# Patient Record
Sex: FEMALE | Race: WHITE | NOT HISPANIC OR LATINO
[De-identification: names, ages, dates, MRNs, and addresses within clinical notes are randomized per-mention and may not be internally consistent; named-entity substitution may affect disease eponyms.]

---

## 2017-01-29 ENCOUNTER — RESULT REVIEW (OUTPATIENT)
Age: 67
End: 2017-01-29

## 2020-04-25 ENCOUNTER — MESSAGE (OUTPATIENT)
Age: 70
End: 2020-04-25

## 2020-09-25 ENCOUNTER — LABORATORY RESULT (OUTPATIENT)
Age: 70
End: 2020-09-25

## 2020-09-25 ENCOUNTER — APPOINTMENT (OUTPATIENT)
Dept: DISASTER EMERGENCY | Facility: CLINIC | Age: 70
End: 2020-09-25

## 2020-09-27 ENCOUNTER — TRANSCRIPTION ENCOUNTER (OUTPATIENT)
Age: 70
End: 2020-09-27

## 2020-09-28 ENCOUNTER — RESULT REVIEW (OUTPATIENT)
Age: 70
End: 2020-09-28

## 2020-09-28 ENCOUNTER — OUTPATIENT (OUTPATIENT)
Dept: OUTPATIENT SERVICES | Facility: HOSPITAL | Age: 70
LOS: 1 days | End: 2020-09-28
Payer: MEDICARE

## 2020-09-28 DIAGNOSIS — Z98.89 OTHER SPECIFIED POSTPROCEDURAL STATES: Chronic | ICD-10-CM

## 2020-09-28 DIAGNOSIS — Z12.11 ENCOUNTER FOR SCREENING FOR MALIGNANT NEOPLASM OF COLON: ICD-10-CM

## 2020-09-28 DIAGNOSIS — Z92.21 PERSONAL HISTORY OF ANTINEOPLASTIC CHEMOTHERAPY: Chronic | ICD-10-CM

## 2020-09-28 DIAGNOSIS — Z90.711 ACQUIRED ABSENCE OF UTERUS WITH REMAINING CERVICAL STUMP: Chronic | ICD-10-CM

## 2020-09-28 DIAGNOSIS — R10.13 EPIGASTRIC PAIN: ICD-10-CM

## 2020-09-28 PROCEDURE — 88305 TISSUE EXAM BY PATHOLOGIST: CPT | Mod: 26

## 2020-09-28 PROCEDURE — 88313 SPECIAL STAINS GROUP 2: CPT | Mod: 26

## 2020-09-28 PROCEDURE — 88312 SPECIAL STAINS GROUP 1: CPT | Mod: 26

## 2020-09-28 PROCEDURE — 88313 SPECIAL STAINS GROUP 2: CPT

## 2020-09-28 PROCEDURE — G0121: CPT

## 2020-09-28 PROCEDURE — 88312 SPECIAL STAINS GROUP 1: CPT

## 2020-09-28 PROCEDURE — 43239 EGD BIOPSY SINGLE/MULTIPLE: CPT

## 2020-09-28 PROCEDURE — 88305 TISSUE EXAM BY PATHOLOGIST: CPT

## 2022-11-09 ENCOUNTER — OFFICE (OUTPATIENT)
Dept: URBAN - METROPOLITAN AREA CLINIC 93 | Facility: CLINIC | Age: 72
Setting detail: OPHTHALMOLOGY
End: 2022-11-09
Payer: MEDICARE

## 2022-11-09 DIAGNOSIS — H33.321: ICD-10-CM

## 2022-11-09 PROCEDURE — 92134 CPTRZ OPH DX IMG PST SGM RTA: CPT | Performed by: OPHTHALMOLOGY

## 2022-11-09 PROCEDURE — 92012 INTRM OPH EXAM EST PATIENT: CPT | Performed by: OPHTHALMOLOGY

## 2022-11-09 PROCEDURE — 92201 OPSCPY EXTND RTA DRAW UNI/BI: CPT | Performed by: OPHTHALMOLOGY

## 2022-11-09 ASSESSMENT — CONFRONTATIONAL VISUAL FIELD TEST (CVF)
OS_FINDINGS: FULL
OD_FINDINGS: FULL

## 2022-11-09 ASSESSMENT — KERATOMETRY
OS_K2POWER_DIOPTERS: 45.00
OS_K1POWER_DIOPTERS: 44.75
OD_AXISANGLE_DEGREES: 100
OD_K1POWER_DIOPTERS: 45.00
OD_K2POWER_DIOPTERS: 45.50
OS_AXISANGLE_DEGREES: 168

## 2022-11-09 ASSESSMENT — REFRACTION_AUTOREFRACTION
OS_CYLINDER: -0.75
OD_SPHERE: UNABLE
OS_AXIS: 096
OS_SPHERE: +3.00

## 2022-11-09 ASSESSMENT — VISUAL ACUITY
OS_BCVA: 20/40+2
OD_BCVA: 20/30-2

## 2022-11-09 ASSESSMENT — TONOMETRY
OS_IOP_MMHG: 12
OD_IOP_MMHG: 12

## 2022-11-09 ASSESSMENT — AXIALLENGTH_DERIVED: OS_AL: 22.1571

## 2022-11-09 ASSESSMENT — SPHEQUIV_DERIVED: OS_SPHEQUIV: 2.625

## 2022-11-09 ASSESSMENT — SUPERFICIAL PUNCTATE KERATITIS (SPK)
OS_SPK: 2+
OD_SPK: 2+

## 2022-11-09 ASSESSMENT — LID EXAM ASSESSMENTS
OD_COMMENTS: GOOD PARALYSIS AT AREA INJECTED UL COMMENTS
OD_BLEPHARITIS: 2+
OS_BLEPHARITIS: 2+
OS_COMMENTS: GOOD PARALYSIS AT AREA INJECTED UL COMMENTS

## 2023-04-04 ENCOUNTER — OFFICE (OUTPATIENT)
Facility: LOCATION | Age: 73
Setting detail: OPHTHALMOLOGY
End: 2023-04-04
Payer: MEDICARE

## 2023-04-04 DIAGNOSIS — H33.321: ICD-10-CM

## 2023-04-04 PROCEDURE — 92012 INTRM OPH EXAM EST PATIENT: CPT | Performed by: OPHTHALMOLOGY

## 2023-04-04 PROCEDURE — 92134 CPTRZ OPH DX IMG PST SGM RTA: CPT | Performed by: OPHTHALMOLOGY

## 2023-04-04 ASSESSMENT — KERATOMETRY
OD_AXISANGLE_DEGREES: 100
OD_K2POWER_DIOPTERS: 45.50
OS_AXISANGLE_DEGREES: 168
OD_K1POWER_DIOPTERS: 45.00
OS_K2POWER_DIOPTERS: 45.00
OS_K1POWER_DIOPTERS: 44.75

## 2023-04-04 ASSESSMENT — LID EXAM ASSESSMENTS
OS_BLEPHARITIS: 2+
OD_COMMENTS: GOOD PARALYSIS AT AREA INJECTED UL COMMENTS
OD_BLEPHARITIS: 2+
OS_COMMENTS: GOOD PARALYSIS AT AREA INJECTED UL COMMENTS

## 2023-04-04 ASSESSMENT — TONOMETRY
OS_IOP_MMHG: 15
OD_IOP_MMHG: 14

## 2023-04-04 ASSESSMENT — REFRACTION_AUTOREFRACTION
OS_AXIS: 096
OS_SPHERE: +3.00
OS_CYLINDER: -0.75
OD_SPHERE: UNABLE

## 2023-04-04 ASSESSMENT — SPHEQUIV_DERIVED: OS_SPHEQUIV: 2.625

## 2023-04-04 ASSESSMENT — CONFRONTATIONAL VISUAL FIELD TEST (CVF)
OD_FINDINGS: FULL
OS_FINDINGS: FULL

## 2023-04-04 ASSESSMENT — SUPERFICIAL PUNCTATE KERATITIS (SPK)
OS_SPK: 2+
OD_SPK: 2+

## 2023-04-04 ASSESSMENT — VISUAL ACUITY
OD_BCVA: 20/40-2
OS_BCVA: 20/50+2

## 2023-04-04 ASSESSMENT — AXIALLENGTH_DERIVED: OS_AL: 22.1571

## 2023-10-03 ENCOUNTER — OFFICE (OUTPATIENT)
Facility: LOCATION | Age: 73
Setting detail: OPHTHALMOLOGY
End: 2023-10-03
Payer: MEDICARE

## 2023-10-03 DIAGNOSIS — H33.321: ICD-10-CM

## 2023-10-03 PROCEDURE — 92134 CPTRZ OPH DX IMG PST SGM RTA: CPT | Performed by: OPHTHALMOLOGY

## 2023-10-03 PROCEDURE — 99213 OFFICE O/P EST LOW 20 MIN: CPT | Performed by: OPHTHALMOLOGY

## 2023-10-03 ASSESSMENT — LID EXAM ASSESSMENTS
OD_BLEPHARITIS: 2+
OS_BLEPHARITIS: 2+
OD_COMMENTS: GOOD PARALYSIS AT AREA INJECTED UL COMMENTS
OS_COMMENTS: GOOD PARALYSIS AT AREA INJECTED UL COMMENTS

## 2023-10-03 ASSESSMENT — SUPERFICIAL PUNCTATE KERATITIS (SPK)
OD_SPK: 2+
OS_SPK: 2+

## 2023-10-03 ASSESSMENT — VISUAL ACUITY
OD_BCVA: 20/60
OS_BCVA: 20/50+2

## 2023-10-03 ASSESSMENT — REFRACTION_AUTOREFRACTION
OS_SPHERE: +3.00
OS_AXIS: 096
OS_CYLINDER: -0.75
OD_SPHERE: UNABLE

## 2023-10-03 ASSESSMENT — KERATOMETRY
OD_K1POWER_DIOPTERS: 45.00
OD_AXISANGLE_DEGREES: 100
OS_K1POWER_DIOPTERS: 44.75
OD_K2POWER_DIOPTERS: 45.50
OS_AXISANGLE_DEGREES: 168
OS_K2POWER_DIOPTERS: 45.00

## 2023-10-03 ASSESSMENT — AXIALLENGTH_DERIVED: OS_AL: 22.1571

## 2023-10-03 ASSESSMENT — SPHEQUIV_DERIVED: OS_SPHEQUIV: 2.625

## 2023-10-25 ENCOUNTER — OFFICE (OUTPATIENT)
Facility: LOCATION | Age: 73
Setting detail: OPHTHALMOLOGY
End: 2023-10-25
Payer: MEDICARE

## 2023-10-25 DIAGNOSIS — H16.223: ICD-10-CM

## 2023-10-25 DIAGNOSIS — H01.004: ICD-10-CM

## 2023-10-25 DIAGNOSIS — H01.001: ICD-10-CM

## 2023-10-25 DIAGNOSIS — H01.005: ICD-10-CM

## 2023-10-25 DIAGNOSIS — H01.002: ICD-10-CM

## 2023-10-25 DIAGNOSIS — H33.321: ICD-10-CM

## 2023-10-25 DIAGNOSIS — H25.13: ICD-10-CM

## 2023-10-25 PROCEDURE — 99214 OFFICE O/P EST MOD 30 MIN: CPT | Performed by: OPHTHALMOLOGY

## 2023-10-25 ASSESSMENT — SUPERFICIAL PUNCTATE KERATITIS (SPK)
OS_SPK: 2+
OD_SPK: 2+

## 2023-10-25 ASSESSMENT — REFRACTION_CURRENTRX
OS_SPHERE: +4.50
OS_ADD: +2.25
OD_ADD: +2.25
OD_SPHERE: +5.25
OD_AXIS: 090
OD_CYLINDER: -0.75
OS_CYLINDER: -1.00
OD_OVR_VA: 20/
OS_AXIS: 090
OS_OVR_VA: 20/

## 2023-10-25 ASSESSMENT — KERATOMETRY
OD_CYLPOWER_DEGREES: 0.5
OD_K1K2_AVERAGE: 44.75
OS_K2POWER_DIOPTERS: 45.25
OD_K1POWER_DIOPTERS: 45.00
OD_K2POWER_DIOPTERS: 44.50
OD_CYLAXISANGLE_DEGREES: 084
OD_AXISANGLE2_DEGREES: 174
OS_K2POWER_DIOPTERS: 45.25
OD_K1POWER_DIOPTERS: 45.00
OD_K2POWER_DIOPTERS: 44.50
OS_K1POWER_DIOPTERS: 44.75
OS_CYLPOWER_DEGREES: 0.5
OS_AXISANGLE_DEGREES: 136
OS_K1POWER_DIOPTERS: 44.75
OS_CYLAXISANGLE_DEGREES: 046
OD_AXISANGLE_DEGREES: 084
OS_K1K2_AVERAGE: 45
OS_AXISANGLE_DEGREES: 046
OS_AXISANGLE2_DEGREES: 046
OD_AXISANGLE_DEGREES: 084

## 2023-10-25 ASSESSMENT — TONOMETRY
OD_IOP_MMHG: 14
OS_IOP_MMHG: 14

## 2023-10-25 ASSESSMENT — AXIALLENGTH_DERIVED
OD_AL: 21.4076
OS_AL: 22.2892

## 2023-10-25 ASSESSMENT — LID EXAM ASSESSMENTS
OS_COMMENTS: GOOD PARALYSIS AT AREA INJECTED UL COMMENTS
OD_COMMENTS: GOOD PARALYSIS AT AREA INJECTED UL COMMENTS
OD_BLEPHARITIS: 2+
OS_BLEPHARITIS: 2+

## 2023-10-25 ASSESSMENT — VISUAL ACUITY
OD_BCVA: 20/50
OS_BCVA: 20/40-

## 2023-10-25 ASSESSMENT — REFRACTION_AUTOREFRACTION
OS_CYLINDER: -1.75
OS_AXIS: 091
OD_CYLINDER: -1.50
OD_SPHERE: +5.75
OS_SPHERE: +3.00
OD_AXIS: 064

## 2023-10-25 ASSESSMENT — CONFRONTATIONAL VISUAL FIELD TEST (CVF)
OD_FINDINGS: FULL
OS_FINDINGS: FULL

## 2023-10-25 ASSESSMENT — SPHEQUIV_DERIVED
OS_SPHEQUIV: 2.125
OD_SPHEQUIV: 5

## 2024-03-26 ENCOUNTER — APPOINTMENT (OUTPATIENT)
Dept: CARDIOLOGY | Facility: CLINIC | Age: 74
End: 2024-03-26
Payer: MEDICARE

## 2024-03-26 ENCOUNTER — APPOINTMENT (OUTPATIENT)
Dept: INTERNAL MEDICINE | Facility: CLINIC | Age: 74
End: 2024-03-26
Payer: MEDICARE

## 2024-03-26 ENCOUNTER — NON-APPOINTMENT (OUTPATIENT)
Age: 74
End: 2024-03-26

## 2024-03-26 VITALS
BODY MASS INDEX: 24.8 KG/M2 | SYSTOLIC BLOOD PRESSURE: 120 MMHG | OXYGEN SATURATION: 96 % | HEIGHT: 63 IN | TEMPERATURE: 98 F | DIASTOLIC BLOOD PRESSURE: 78 MMHG | WEIGHT: 140 LBS | HEART RATE: 68 BPM

## 2024-03-26 DIAGNOSIS — Z00.00 ENCOUNTER FOR GENERAL ADULT MEDICAL EXAMINATION W/OUT ABNORMAL FINDINGS: ICD-10-CM

## 2024-03-26 DIAGNOSIS — K56.609 UNSPECIFIED INTESTINAL OBSTRUCTION, UNSPECIFIED AS TO PARTIAL VERSUS COMPLETE OBSTRUCTION: ICD-10-CM

## 2024-03-26 DIAGNOSIS — Z12.9 ENCOUNTER FOR SCREENING FOR MALIGNANT NEOPLASM, SITE UNSPECIFIED: ICD-10-CM

## 2024-03-26 DIAGNOSIS — R22.1 LOCALIZED SWELLING, MASS AND LUMP, NECK: ICD-10-CM

## 2024-03-26 DIAGNOSIS — Z13.228 ENCOUNTER FOR SCREENING FOR OTHER METABOLIC DISORDERS: ICD-10-CM

## 2024-03-26 DIAGNOSIS — C50.919 MALIGNANT NEOPLASM OF UNSPECIFIED SITE OF UNSPECIFIED FEMALE BREAST: ICD-10-CM

## 2024-03-26 DIAGNOSIS — M51.9 UNSPECIFIED THORACIC, THORACOLUMBAR AND LUMBOSACRAL INTERVERTEBRAL DISC DISORDER: ICD-10-CM

## 2024-03-26 DIAGNOSIS — E66.9 OBESITY, UNSPECIFIED: ICD-10-CM

## 2024-03-26 DIAGNOSIS — Z13.6 ENCOUNTER FOR SCREENING FOR CARDIOVASCULAR DISORDERS: ICD-10-CM

## 2024-03-26 PROCEDURE — 93306 TTE W/DOPPLER COMPLETE: CPT

## 2024-03-26 PROCEDURE — 99204 OFFICE O/P NEW MOD 45 MIN: CPT

## 2024-03-26 PROCEDURE — 93000 ELECTROCARDIOGRAM COMPLETE: CPT

## 2024-03-27 PROBLEM — Z13.228 SCREENING FOR METABOLIC DISORDER: Status: ACTIVE | Noted: 2024-03-26

## 2024-03-27 PROBLEM — R22.1 NECK FULLNESS: Status: ACTIVE | Noted: 2024-03-27

## 2024-03-27 PROBLEM — M51.9 LUMBAR DISC DISEASE: Status: ACTIVE | Noted: 2024-03-26

## 2024-03-27 PROBLEM — K56.609 BOWEL OBSTRUCTION: Status: ACTIVE | Noted: 2024-03-26

## 2024-03-27 PROBLEM — E66.9 OBESITY (BMI 30.0-34.9): Status: ACTIVE | Noted: 2024-03-26

## 2024-03-27 PROBLEM — C50.919 BREAST CANCER: Status: ACTIVE | Noted: 2024-03-26

## 2024-03-27 PROBLEM — Z12.9 SCREENING FOR CANCER: Status: ACTIVE | Noted: 2024-03-26

## 2024-03-27 PROBLEM — Z13.6 SCREENING FOR HEART DISEASE: Status: ACTIVE | Noted: 2024-03-26

## 2024-03-27 LAB
25(OH)D3 SERPL-MCNC: 74.6 NG/ML
ALBUMIN SERPL ELPH-MCNC: 4.7 G/DL
ALP BLD-CCNC: 84 U/L
ALT SERPL-CCNC: 21 U/L
ANION GAP SERPL CALC-SCNC: 16 MMOL/L
APPEARANCE: CLEAR
AST SERPL-CCNC: 22 U/L
BACTERIA: NEGATIVE /HPF
BASOPHILS # BLD AUTO: 0.01 K/UL
BASOPHILS NFR BLD AUTO: 0.2 %
BILIRUB SERPL-MCNC: 0.3 MG/DL
BILIRUBIN URINE: NEGATIVE
BLOOD URINE: NEGATIVE
BUN SERPL-MCNC: 13 MG/DL
CALCIUM OXALATE CRYSTALS: PRESENT
CALCIUM SERPL-MCNC: 10.1 MG/DL
CAST: 0 /LPF
CHLORIDE SERPL-SCNC: 103 MMOL/L
CHOLEST SERPL-MCNC: 198 MG/DL
CK SERPL-CCNC: 84 U/L
CO2 SERPL-SCNC: 23 MMOL/L
COLOR: YELLOW
CREAT SERPL-MCNC: 0.73 MG/DL
CREAT SPEC-SCNC: 143 MG/DL
EGFR: 86 ML/MIN/1.73M2
EOSINOPHIL # BLD AUTO: 0.02 K/UL
EOSINOPHIL NFR BLD AUTO: 0.3 %
EPITHELIAL CELLS: 1 /HPF
ESTIMATED AVERAGE GLUCOSE: 105 MG/DL
FERRITIN SERPL-MCNC: 117 NG/ML
FOLATE SERPL-MCNC: >20 NG/ML
GLUCOSE QUALITATIVE U: NEGATIVE MG/DL
GLUCOSE SERPL-MCNC: 69 MG/DL
HBA1C MFR BLD HPLC: 5.3 %
HCT VFR BLD CALC: 42.2 %
HDLC SERPL-MCNC: 45 MG/DL
HGB BLD-MCNC: 14.3 G/DL
IMM GRANULOCYTES NFR BLD AUTO: 0 %
IRON SATN MFR SERPL: 25 %
IRON SERPL-MCNC: 86 UG/DL
KETONES URINE: NEGATIVE MG/DL
LDLC SERPL CALC-MCNC: 111 MG/DL
LEUKOCYTE ESTERASE URINE: ABNORMAL
LYMPHOCYTES # BLD AUTO: 2.16 K/UL
LYMPHOCYTES NFR BLD AUTO: 35.9 %
MAGNESIUM SERPL-MCNC: 2.3 MG/DL
MAN DIFF?: NORMAL
MCHC RBC-ENTMCNC: 31.5 PG
MCHC RBC-ENTMCNC: 33.9 GM/DL
MCV RBC AUTO: 93 FL
MICROALBUMIN 24H UR DL<=1MG/L-MCNC: 1.6 MG/DL
MICROALBUMIN/CREAT 24H UR-RTO: 11 MG/G
MICROSCOPIC-UA: NORMAL
MONOCYTES # BLD AUTO: 0.52 K/UL
MONOCYTES NFR BLD AUTO: 8.7 %
NEUTROPHILS # BLD AUTO: 3.3 K/UL
NEUTROPHILS NFR BLD AUTO: 54.9 %
NITRITE URINE: NEGATIVE
NONHDLC SERPL-MCNC: 153 MG/DL
PH URINE: 6
PLATELET # BLD AUTO: 307 K/UL
POTASSIUM SERPL-SCNC: 5 MMOL/L
PROT SERPL-MCNC: 7.2 G/DL
PROTEIN URINE: NEGATIVE MG/DL
RBC # BLD: 4.54 M/UL
RBC # FLD: 13.2 %
RED BLOOD CELLS URINE: 4 /HPF
REVIEW: NORMAL
SODIUM SERPL-SCNC: 142 MMOL/L
SPECIFIC GRAVITY URINE: 1.02
T4 FREE SERPL-MCNC: 1.3 NG/DL
TIBC SERPL-MCNC: 346 UG/DL
TRIGL SERPL-MCNC: 242 MG/DL
TSH SERPL-ACNC: 2.93 UIU/ML
UIBC SERPL-MCNC: 260 UG/DL
URATE SERPL-MCNC: 4.9 MG/DL
UROBILINOGEN URINE: 0.2 MG/DL
VIT B12 SERPL-MCNC: 617 PG/ML
WBC # FLD AUTO: 6.01 K/UL
WHITE BLOOD CELLS URINE: 37 /HPF

## 2024-03-27 NOTE — HEALTH RISK ASSESSMENT
[0] : 2) Feeling down, depressed, or hopeless: Not at all (0) [PHQ-2 Negative - No further assessment needed] : PHQ-2 Negative - No further assessment needed [Former] : Former [> 15 Years] : > 15 Years [[id number]] : 0

## 2024-03-27 NOTE — HISTORY OF PRESENT ILLNESS
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 Pt states she has noticed an irregular heartbeat, neck fullness lump in threat, and a bounding pulse for past 3-4 days ago, comes and goes. Says each episode occurs for minutes. Denies Hx of afib. She neck fullness for months which is present even if she doesn't have palpitations. Denies odynophagia, dysphagia. Reports EGD several years ago was normal. Only drinks 1 cup of coffee Denies chest pain, SOB, VALENCIA, dizziness, diaphoresis, LE swelling, orthopnea, syncope, n/v, headache.

## 2024-05-09 ENCOUNTER — NON-APPOINTMENT (OUTPATIENT)
Age: 74
End: 2024-05-09

## 2024-05-30 ENCOUNTER — APPOINTMENT (OUTPATIENT)
Dept: CARDIOLOGY | Facility: CLINIC | Age: 74
End: 2024-05-30
Payer: MEDICARE

## 2024-05-30 DIAGNOSIS — R94.39 ABNORMAL RESULT OF OTHER CARDIOVASCULAR FUNCTION STUDY: ICD-10-CM

## 2024-05-30 PROCEDURE — 93015 CV STRESS TEST SUPVJ I&R: CPT

## 2024-05-31 ENCOUNTER — NON-APPOINTMENT (OUTPATIENT)
Age: 74
End: 2024-05-31

## 2024-06-03 ENCOUNTER — APPOINTMENT (OUTPATIENT)
Dept: CT IMAGING | Facility: CLINIC | Age: 74
End: 2024-06-03
Payer: MEDICARE

## 2024-06-03 PROCEDURE — 75574 CT ANGIO HRT W/3D IMAGE: CPT

## 2024-06-03 RX ORDER — KRILL/OM-3/DHA/EPA/PHOSPHO/AST 1000-230MG
81 CAPSULE ORAL
Qty: 90 | Refills: 1 | Status: DISCONTINUED | COMMUNITY
Start: 2024-05-30 | End: 2024-06-03

## 2024-06-18 ENCOUNTER — APPOINTMENT (OUTPATIENT)
Dept: CARDIOLOGY | Facility: CLINIC | Age: 74
End: 2024-06-18
Payer: MEDICARE

## 2024-06-18 VITALS
HEART RATE: 79 BPM | BODY MASS INDEX: 24.98 KG/M2 | HEIGHT: 63 IN | DIASTOLIC BLOOD PRESSURE: 72 MMHG | OXYGEN SATURATION: 97 % | SYSTOLIC BLOOD PRESSURE: 120 MMHG | WEIGHT: 141 LBS | TEMPERATURE: 98.2 F

## 2024-06-18 DIAGNOSIS — R94.31 ABNORMAL ELECTROCARDIOGRAM [ECG] [EKG]: ICD-10-CM

## 2024-06-18 DIAGNOSIS — R00.2 PALPITATIONS: ICD-10-CM

## 2024-06-18 DIAGNOSIS — I47.10 SUPRAVENTRICULAR TACHYCARDIA, UNSPECIFIED: ICD-10-CM

## 2024-06-18 DIAGNOSIS — I49.3 VENTRICULAR PREMATURE DEPOLARIZATION: ICD-10-CM

## 2024-06-18 DIAGNOSIS — R06.09 OTHER FORMS OF DYSPNEA: ICD-10-CM

## 2024-06-18 DIAGNOSIS — R93.1 ABNORMAL FINDINGS ON DIAGNOSTIC IMAGING OF HEART AND CORONARY CIRCULATION: ICD-10-CM

## 2024-06-18 DIAGNOSIS — I35.1 NONRHEUMATIC AORTIC (VALVE) INSUFFICIENCY: ICD-10-CM

## 2024-06-18 PROCEDURE — 93000 ELECTROCARDIOGRAM COMPLETE: CPT

## 2024-06-18 PROCEDURE — 99214 OFFICE O/P EST MOD 30 MIN: CPT

## 2024-06-18 NOTE — HISTORY OF PRESENT ILLNESS
[FreeTextEntry1] : This is a 75 y/o female with a pmhx of breast CA here today to establish care. Patient was seen 3/26/24 by Dr. Osorio reporting palpitations. Echo 3/2024 showed normal EF, mild-mod AR.  Holter 3/2024 showed Sinus Rhythm, runs of SVT, PVC burden on 5.69%. Patient reports palpitations have resolved.  Patient was noted with abnormal EKG. She had EST 5/2024 which was abnormal. CTCA 6/2024 showing CAC 32, no significant CAD. Advised to start lipitor. Lung nodule seen, referred to pulm.  Patient admits to dyspnea on exertion.  Patient denies chest pain, dyspnea, palpitations, dizziness, syncope, changes in bowel/bladder habits or appetite.

## 2024-06-20 ENCOUNTER — APPOINTMENT (OUTPATIENT)
Dept: PULMONOLOGY | Facility: CLINIC | Age: 74
End: 2024-06-20
Payer: MEDICARE

## 2024-06-20 VITALS — DIASTOLIC BLOOD PRESSURE: 66 MMHG | OXYGEN SATURATION: 95 % | HEART RATE: 73 BPM | SYSTOLIC BLOOD PRESSURE: 113 MMHG

## 2024-06-20 DIAGNOSIS — R91.1 SOLITARY PULMONARY NODULE: ICD-10-CM

## 2024-06-20 PROCEDURE — 94618 PULMONARY STRESS TESTING: CPT

## 2024-06-20 PROCEDURE — 94727 GAS DIL/WSHOT DETER LNG VOL: CPT

## 2024-06-20 PROCEDURE — 94010 BREATHING CAPACITY TEST: CPT

## 2024-06-20 PROCEDURE — ZZZZZ: CPT

## 2024-06-20 PROCEDURE — 99205 OFFICE O/P NEW HI 60 MIN: CPT | Mod: 25

## 2024-06-20 PROCEDURE — 94729 DIFFUSING CAPACITY: CPT

## 2024-06-20 PROCEDURE — 95012 NITRIC OXIDE EXP GAS DETER: CPT

## 2024-06-20 NOTE — PHYSICAL EXAM
[No Acute Distress] : no acute distress [Normal Oropharynx] : normal oropharynx [I] : Mallampati Class: I [Normal Appearance] : normal appearance [Supple] : supple [No JVD] : no jvd [Normal Rate/Rhythm] : normal rate/rhythm [Normal S1, S2] : normal s1, s2 [No Murmurs] : no murmurs [No Rubs] : no rubs [No Gallops] : no gallops [No Resp Distress] : no resp distress [No Acc Muscle Use] : no acc muscle use [Normal Palpation] : normal palpation [Normal Rhythm and Effort] : normal rhythm and effort [Clear to Auscultation Bilaterally] : clear to auscultation bilaterally [Normal to Percussion] : normal to percussion [No Abnormalities] : no abnormalities [Benign] : benign [Not Tender] : not tender [Soft] : soft [No HSM] : no hsm [Normal Bowel Sounds] : normal bowel sounds [Normal Gait] : normal gait [No Clubbing] : no clubbing [No Cyanosis] : no cyanosis [No Edema] : no edema [FROM] : FROM [Normal Color/ Pigmentation] : normal color/ pigmentation [No Focal Deficits] : no focal deficits [Oriented x3] : oriented x3 [Normal Affect] : normal affect

## 2024-06-20 NOTE — HISTORY OF PRESENT ILLNESS
[Former] : former [>= 20 pack years] : >= 20 pack years [Awakes Unrefreshed] : awakes unrefreshed [Daytime Somnolence] : daytime somnolence [Snoring] : snoring [Tired while Driving] : tired while driving [TextBox_11] : 1 [YearQuit] : 2007

## 2024-06-20 NOTE — DISCUSSION/SUMMARY
[FreeTextEntry1] : Abnormal CT coronary artery study noncardiac findings Pulmonary nodules Elongated 7 mm pulmonary nodule right upper lobe at the perifissural region Additional 3 mm right middle lobe pulmonary nodule Nonspecific Rule out postinflammatory nodule Recommendation Former smoker Repeat CT chest 6 months If stable then repeat in 1 year follow-up greater than 2 years Clinical suspicion obstructive sleep apnea Discussed with patient Would like to reconsider present will not pursue with sleep study  Office follow-up 6 months

## 2024-06-20 NOTE — PROCEDURE
[FreeTextEntry1] : CT CA study	 EXAM: [id number] - CT ANGIO HEART CORONARY IC  - ORDERED BY: BRENDA ARGUELLO PROCEDURE DATE:  06/03/2024 INTERPRETATION:  CT ANGIO HEART WITH IV CONTRAST COMPARISON: None CALCIUM SCORE: The calculated Agatston score is 32. NONCARDIAC FINDINGS: Imaged central airways are patent. Mild bilateral lung scarring/atelectasis of the imaged thorax. No visualized pleural effusion. Elongated 7 mm nodule of the right upper lobe in perifissural location, image 24 series 304. Additional 3 mm right middle lobe pulmonary nodule image 45 series 4. Small volume lymph nodes of the imaged thorax. Visualized esophagus is nondistended. Small hiatal hernia. Partially imaged upper abdomen is unremarkable. Left breast reconstruction postoperative changes are partially imaged. Degenerative changes of the visualized bones and osseous demineralization. Left anterior chronic appearing rib irregularities may be due to prior trauma.   PFT 6/20/24 Saman WNL  Lung Volumes WNL DLCO  88 % HGB 14.3  NIOX 18 normal range no evidence for airway inflammation June 20, 2024  Pulmonary 6-minute walk exercise study June 20, 2024 Placed baseline O2 saturation 97% on room air Study is normal No evidence of exercise-induced hypoxemia  CBC March 2024 WNL no inc EOS

## 2024-06-20 NOTE — REVIEW OF SYSTEMS
[Nasal Congestion] : nasal congestion [SOB on Exertion] : sob on exertion [Palpitations] : palpitations [Negative] : Neurologic [Cough] : no cough [Hemoptysis] : no hemoptysis [Chest Tightness] : no chest tightness [Sputum] : no sputum [Pleuritic Pain] : no pleuritic pain [Wheezing] : no wheezing [A.M. Dry Mouth] : no a.m. dry mouth [Orthopnea] : no orthopnea [Syncope] : no syncope [Depression] : no depression [Anxiety] : no anxiety [Diabetes] : no diabetes [Thyroid Problem] : no thyroid problem [TextBox_30] : HPI [TextBox_44] : Hx SVT, elevated CA Ca++ score Mild RA [TextBox_104] : mild psoriasis

## 2024-06-20 NOTE — REASON FOR VISIT
[Initial] : an initial visit [Shortness of Breath] : shortness of breath [TextBox_44] : Pulmonary Nodule

## 2024-07-18 DIAGNOSIS — E78.00 PURE HYPERCHOLESTEROLEMIA, UNSPECIFIED: ICD-10-CM

## 2024-08-01 RX ORDER — OMEGA-3/DHA/EPA/FISH OIL 300-1000MG
1000 CAPSULE ORAL
Qty: 90 | Refills: 1 | Status: ACTIVE | COMMUNITY
Start: 2024-08-01 | End: 1900-01-01

## 2024-09-16 ENCOUNTER — NON-APPOINTMENT (OUTPATIENT)
Age: 74
End: 2024-09-16

## 2024-09-18 ENCOUNTER — APPOINTMENT (OUTPATIENT)
Dept: ENDOCRINOLOGY | Facility: CLINIC | Age: 74
End: 2024-09-18
Payer: MEDICARE

## 2024-09-18 VITALS
BODY MASS INDEX: 25.34 KG/M2 | SYSTOLIC BLOOD PRESSURE: 120 MMHG | RESPIRATION RATE: 18 BRPM | HEIGHT: 63 IN | WEIGHT: 143 LBS | DIASTOLIC BLOOD PRESSURE: 84 MMHG | TEMPERATURE: 97.9 F | HEART RATE: 77 BPM | OXYGEN SATURATION: 97 %

## 2024-09-18 DIAGNOSIS — R22.1 LOCALIZED SWELLING, MASS AND LUMP, NECK: ICD-10-CM

## 2024-09-18 PROCEDURE — 99204 OFFICE O/P NEW MOD 45 MIN: CPT

## 2024-09-18 NOTE — HISTORY OF PRESENT ILLNESS
[FreeTextEntry1] : 74 year F, referred for management of reported enlarged thyroid/ nodule  Patient had been ordered thyroid sonogram by PCP, however did not do.   Patient with past medical hx as below  Related thyroid hx:   Prior or current medication thyroid use: No Known family or personal hx of thyroid disease: yes, mother with hypothyroidism  Goiter or hx of goiter: No Known Hx of autoimmune disease: No History of Radioactive iodine therapy/ Chest or Neck radiation therapy: No  Reported symptoms:   Fatigue: No Weight loss without significant change in appetite: No   Heat intolerance: No Irritability, anxiety or agitation: No Weakness, tremor: No Palpitations: No  Exertional dyspnea: No Hyper defecation: No Anterior neck pain: No  Insomnia: No  Dyspnea: No Dysphagia: No

## 2024-10-02 ENCOUNTER — APPOINTMENT (OUTPATIENT)
Dept: ULTRASOUND IMAGING | Facility: CLINIC | Age: 74
End: 2024-10-02
Payer: MEDICARE

## 2024-10-02 ENCOUNTER — OUTPATIENT (OUTPATIENT)
Dept: OUTPATIENT SERVICES | Facility: HOSPITAL | Age: 74
LOS: 1 days | End: 2024-10-02
Payer: MEDICARE

## 2024-10-02 DIAGNOSIS — Z90.711 ACQUIRED ABSENCE OF UTERUS WITH REMAINING CERVICAL STUMP: Chronic | ICD-10-CM

## 2024-10-02 DIAGNOSIS — Z92.21 PERSONAL HISTORY OF ANTINEOPLASTIC CHEMOTHERAPY: Chronic | ICD-10-CM

## 2024-10-02 DIAGNOSIS — Z98.89 OTHER SPECIFIED POSTPROCEDURAL STATES: Chronic | ICD-10-CM

## 2024-10-02 DIAGNOSIS — Z00.8 ENCOUNTER FOR OTHER GENERAL EXAMINATION: ICD-10-CM

## 2024-10-02 PROCEDURE — 76536 US EXAM OF HEAD AND NECK: CPT | Mod: 26

## 2024-10-10 ENCOUNTER — OFFICE (OUTPATIENT)
Facility: LOCATION | Age: 74
Setting detail: OPHTHALMOLOGY
End: 2024-10-10
Payer: MEDICARE

## 2024-10-10 DIAGNOSIS — H01.004: ICD-10-CM

## 2024-10-10 DIAGNOSIS — H25.12: ICD-10-CM

## 2024-10-10 DIAGNOSIS — H01.001: ICD-10-CM

## 2024-10-10 DIAGNOSIS — H33.321: ICD-10-CM

## 2024-10-10 DIAGNOSIS — H01.005: ICD-10-CM

## 2024-10-10 DIAGNOSIS — H01.002: ICD-10-CM

## 2024-10-10 DIAGNOSIS — H35.40: ICD-10-CM

## 2024-10-10 DIAGNOSIS — H16.223: ICD-10-CM

## 2024-10-10 DIAGNOSIS — H25.13: ICD-10-CM

## 2024-10-10 DIAGNOSIS — H35.372: ICD-10-CM

## 2024-10-10 PROBLEM — H25.11 CATARACT SENILE NUCLEAR SCLEROSIS; RIGHT EYE, LEFT EYE, BOTH EYES: Status: ACTIVE | Noted: 2024-10-10

## 2024-10-10 PROCEDURE — 92136 OPHTHALMIC BIOMETRY: CPT | Mod: TC | Performed by: OPHTHALMOLOGY

## 2024-10-10 PROCEDURE — 92134 CPTRZ OPH DX IMG PST SGM RTA: CPT | Performed by: OPHTHALMOLOGY

## 2024-10-10 PROCEDURE — 92136 OPHTHALMIC BIOMETRY: CPT | Mod: 26,LT | Performed by: OPHTHALMOLOGY

## 2024-10-10 PROCEDURE — 99214 OFFICE O/P EST MOD 30 MIN: CPT | Performed by: OPHTHALMOLOGY

## 2024-10-10 ASSESSMENT — REFRACTION_CURRENTRX
OD_ADD: +2.25
OD_VPRISM_DIRECTION: PROGS
OS_CYLINDER: -1.00
OD_CYLINDER: -0.50
OD_CYLINDER: -0.75
OS_SPHERE: +4.00
OS_VPRISM_DIRECTION: PROGS
OD_SPHERE: +5.00
OD_OVR_VA: 20/
OD_AXIS: 090
OS_CYLINDER: -1.00
OS_OVR_VA: 20/
OD_OVR_VA: 20/
OS_AXIS: 090
OD_SPHERE: +5.25
OS_SPHERE: +4.50
OS_OVR_VA: 20/
OD_AXIS: 094
OS_AXIS: 094
OS_ADD: +2.25
OS_ADD: +2.25
OD_ADD: +2.25

## 2024-10-10 ASSESSMENT — REFRACTION_AUTOREFRACTION
OS_CYLINDER: -1.50
OD_SPHERE: +4.50
OS_SPHERE: +2.25
OD_AXIS: 067
OS_AXIS: 104
OD_CYLINDER: -1.00

## 2024-10-10 ASSESSMENT — KERATOMETRY
OD_AXISANGLE_DEGREES: 090
OD_K2POWER_DIOPTERS: 45.00
OS_K1POWER_DIOPTERS: 45.00
OS_K2POWER_DIOPTERS: 45.50
OD_K1POWER_DIOPTERS: 45.00
OS_AXISANGLE_DEGREES: 070

## 2024-10-10 ASSESSMENT — LID EXAM ASSESSMENTS
OD_COMMENTS: GOOD PARALYSIS AT AREA INJECTED UL COMMENTS
OS_BLEPHARITIS: 2+
OD_BLEPHARITIS: 2+
OS_COMMENTS: GOOD PARALYSIS AT AREA INJECTED UL COMMENTS

## 2024-10-10 ASSESSMENT — CONFRONTATIONAL VISUAL FIELD TEST (CVF)
OD_FINDINGS: FULL
OS_FINDINGS: FULL

## 2024-10-10 ASSESSMENT — SUPERFICIAL PUNCTATE KERATITIS (SPK)
OS_SPK: 2+
OD_SPK: 2+

## 2024-10-10 ASSESSMENT — VISUAL ACUITY
OD_BCVA: 20/50+2
OS_BCVA: 20/30-2

## 2024-11-04 ENCOUNTER — APPOINTMENT (OUTPATIENT)
Dept: CARDIOLOGY | Facility: CLINIC | Age: 74
End: 2024-11-04

## 2024-11-04 VITALS
RESPIRATION RATE: 16 BRPM | DIASTOLIC BLOOD PRESSURE: 70 MMHG | OXYGEN SATURATION: 96 % | HEIGHT: 63 IN | HEART RATE: 72 BPM | SYSTOLIC BLOOD PRESSURE: 104 MMHG | BODY MASS INDEX: 25.52 KG/M2 | TEMPERATURE: 98.3 F | WEIGHT: 144 LBS

## 2024-11-04 DIAGNOSIS — I49.3 VENTRICULAR PREMATURE DEPOLARIZATION: ICD-10-CM

## 2024-11-04 DIAGNOSIS — Z84.1 FAMILY HISTORY OF DISORDERS OF KIDNEY AND URETER: ICD-10-CM

## 2024-11-04 DIAGNOSIS — Z13.228 ENCOUNTER FOR SCREENING FOR OTHER METABOLIC DISORDERS: ICD-10-CM

## 2024-11-04 DIAGNOSIS — I35.1 NONRHEUMATIC AORTIC (VALVE) INSUFFICIENCY: ICD-10-CM

## 2024-11-04 DIAGNOSIS — Z01.818 ENCOUNTER FOR OTHER PREPROCEDURAL EXAMINATION: ICD-10-CM

## 2024-11-04 DIAGNOSIS — Z87.19 PERSONAL HISTORY OF OTHER DISEASES OF THE DIGESTIVE SYSTEM: ICD-10-CM

## 2024-11-04 DIAGNOSIS — E04.1 NONTOXIC SINGLE THYROID NODULE: ICD-10-CM

## 2024-11-04 DIAGNOSIS — Z82.49 FAMILY HISTORY OF ISCHEMIC HEART DISEASE AND OTHER DISEASES OF THE CIRCULATORY SYSTEM: ICD-10-CM

## 2024-11-04 DIAGNOSIS — Z71.85 ENCOUNTER FOR IMMUNIZATION SAFETY COUNSELING: ICD-10-CM

## 2024-11-04 DIAGNOSIS — E78.00 PURE HYPERCHOLESTEROLEMIA, UNSPECIFIED: ICD-10-CM

## 2024-11-04 DIAGNOSIS — Z80.42 FAMILY HISTORY OF MALIGNANT NEOPLASM OF PROSTATE: ICD-10-CM

## 2024-11-04 DIAGNOSIS — I47.10 SUPRAVENTRICULAR TACHYCARDIA, UNSPECIFIED: ICD-10-CM

## 2024-11-04 DIAGNOSIS — Z83.3 FAMILY HISTORY OF DIABETES MELLITUS: ICD-10-CM

## 2024-11-04 DIAGNOSIS — R06.09 OTHER FORMS OF DYSPNEA: ICD-10-CM

## 2024-11-04 DIAGNOSIS — R94.31 ABNORMAL ELECTROCARDIOGRAM [ECG] [EKG]: ICD-10-CM

## 2024-11-04 DIAGNOSIS — R91.1 SOLITARY PULMONARY NODULE: ICD-10-CM

## 2024-11-04 DIAGNOSIS — R93.1 ABNORMAL FINDINGS ON DIAGNOSTIC IMAGING OF HEART AND CORONARY CIRCULATION: ICD-10-CM

## 2024-11-04 DIAGNOSIS — Z82.0 FAMILY HISTORY OF EPILEPSY AND OTHER DISEASES OF THE NERVOUS SYSTEM: ICD-10-CM

## 2024-11-04 DIAGNOSIS — C50.919 MALIGNANT NEOPLASM OF UNSPECIFIED SITE OF UNSPECIFIED FEMALE BREAST: ICD-10-CM

## 2024-11-04 DIAGNOSIS — M51.9 UNSPECIFIED THORACIC, THORACOLUMBAR AND LUMBOSACRAL INTERVERTEBRAL DISC DISORDER: ICD-10-CM

## 2024-11-04 DIAGNOSIS — H26.9 UNSPECIFIED CATARACT: ICD-10-CM

## 2024-11-04 DIAGNOSIS — Z87.891 PERSONAL HISTORY OF NICOTINE DEPENDENCE: ICD-10-CM

## 2024-11-04 DIAGNOSIS — Z83.0 FAMILY HISTORY OF HUMAN IMMUNODEFICIENCY VIRUS [HIV] DISEASE: ICD-10-CM

## 2024-11-04 DIAGNOSIS — R17 UNSPECIFIED JAUNDICE: ICD-10-CM

## 2024-11-04 PROCEDURE — 99214 OFFICE O/P EST MOD 30 MIN: CPT

## 2024-11-04 PROCEDURE — G2211 COMPLEX E/M VISIT ADD ON: CPT

## 2024-11-04 PROCEDURE — 93000 ELECTROCARDIOGRAM COMPLETE: CPT

## 2024-11-04 RX ORDER — MULTIVIT-MIN/FOLIC/VIT K/LYCOP 400-300MCG
50 MCG TABLET ORAL
Qty: 90 | Refills: 3 | Status: ACTIVE | COMMUNITY

## 2024-11-04 RX ORDER — CHLORHEXIDINE GLUCONATE 4 %
LIQUID (ML) TOPICAL
Refills: 0 | Status: ACTIVE | COMMUNITY

## 2024-11-05 ENCOUNTER — NON-APPOINTMENT (OUTPATIENT)
Age: 74
End: 2024-11-05

## 2024-11-05 LAB
ALBUMIN SERPL ELPH-MCNC: 4.6 G/DL
ALP BLD-CCNC: 98 U/L
ALT SERPL-CCNC: 27 U/L
ANION GAP SERPL CALC-SCNC: 16 MMOL/L
AST SERPL-CCNC: 26 U/L
BASOPHILS # BLD AUTO: 0.02 K/UL
BASOPHILS NFR BLD AUTO: 0.4 %
BILIRUB DIRECT SERPL-MCNC: 0.1 MG/DL
BILIRUB INDIRECT SERPL-MCNC: 0.1 MG/DL
BILIRUB SERPL-MCNC: 0.2 MG/DL
BUN SERPL-MCNC: 18 MG/DL
CALCIUM SERPL-MCNC: 9.6 MG/DL
CHLORIDE SERPL-SCNC: 101 MMOL/L
CO2 SERPL-SCNC: 23 MMOL/L
CREAT SERPL-MCNC: 0.73 MG/DL
EGFR: 86 ML/MIN/1.73M2
EOSINOPHIL # BLD AUTO: 0.02 K/UL
EOSINOPHIL NFR BLD AUTO: 0.4 %
ESTIMATED AVERAGE GLUCOSE: 103 MG/DL
GLUCOSE SERPL-MCNC: 77 MG/DL
HBA1C MFR BLD HPLC: 5.2 %
HCT VFR BLD CALC: 43.2 %
HGB BLD-MCNC: 14.2 G/DL
IMM GRANULOCYTES NFR BLD AUTO: 0.2 %
LYMPHOCYTES # BLD AUTO: 1.77 K/UL
LYMPHOCYTES NFR BLD AUTO: 33.7 %
MAGNESIUM SERPL-MCNC: 2.4 MG/DL
MAN DIFF?: NORMAL
MCHC RBC-ENTMCNC: 31.9 PG
MCHC RBC-ENTMCNC: 32.9 G/DL
MCV RBC AUTO: 97.1 FL
MONOCYTES # BLD AUTO: 0.45 K/UL
MONOCYTES NFR BLD AUTO: 8.6 %
NEUTROPHILS # BLD AUTO: 2.99 K/UL
NEUTROPHILS NFR BLD AUTO: 56.7 %
PLATELET # BLD AUTO: 278 K/UL
POTASSIUM SERPL-SCNC: 4.3 MMOL/L
PROT SERPL-MCNC: 7.2 G/DL
RBC # BLD: 4.45 M/UL
RBC # FLD: 13.3 %
SODIUM SERPL-SCNC: 139 MMOL/L
T4 FREE SERPL-MCNC: 1.2 NG/DL
TSH SERPL-ACNC: 2.79 UIU/ML
WBC # FLD AUTO: 5.26 K/UL

## 2024-11-11 ENCOUNTER — ASC (OUTPATIENT)
Dept: URBAN - METROPOLITAN AREA SURGERY 8 | Facility: SURGERY | Age: 74
Setting detail: OPHTHALMOLOGY
End: 2024-11-11
Payer: MEDICARE

## 2024-11-11 DIAGNOSIS — H25.12: ICD-10-CM

## 2024-11-11 DIAGNOSIS — H52.212: ICD-10-CM

## 2024-11-11 PROCEDURE — A9270 NON-COVERED ITEM OR SERVICE: HCPCS | Mod: GY | Performed by: OPHTHALMOLOGY

## 2024-11-11 PROCEDURE — 66984 XCAPSL CTRC RMVL W/O ECP: CPT | Mod: LT | Performed by: OPHTHALMOLOGY

## 2024-11-11 PROCEDURE — FEMTO FEMTOSECOND LASER: Mod: GY | Performed by: OPHTHALMOLOGY

## 2024-11-11 PROCEDURE — 68841 INSJ RX ELUT IMPLT LAC CANAL: CPT | Mod: LT | Performed by: OPHTHALMOLOGY

## 2024-11-12 ENCOUNTER — RX ONLY (RX ONLY)
Age: 74
End: 2024-11-12

## 2024-11-12 ENCOUNTER — OFFICE (OUTPATIENT)
Facility: LOCATION | Age: 74
Setting detail: OPHTHALMOLOGY
End: 2024-11-12
Payer: MEDICARE

## 2024-11-12 DIAGNOSIS — Z96.1: ICD-10-CM

## 2024-11-12 PROCEDURE — 99024 POSTOP FOLLOW-UP VISIT: CPT | Performed by: OPHTHALMOLOGY

## 2024-11-12 ASSESSMENT — REFRACTION_AUTOREFRACTION
OD_SPHERE: +4.25
OS_SPHERE: +1.25
OS_AXIS: 163
OD_CYLINDER: -0.75
OS_CYLINDER: -0.25
OD_AXIS: 077

## 2024-11-12 ASSESSMENT — REFRACTION_CURRENTRX
OS_ADD: +2.25
OS_VPRISM_DIRECTION: PROGS
OD_OVR_VA: 20/
OD_OVR_VA: 20/
OS_SPHERE: +4.50
OS_CYLINDER: -1.00
OS_AXIS: 090
OS_AXIS: 094
OD_CYLINDER: -0.50
OD_CYLINDER: -0.75
OD_AXIS: 094
OS_CYLINDER: -1.00
OD_AXIS: 090
OS_OVR_VA: 20/
OD_SPHERE: +5.25
OS_SPHERE: +4.00
OD_ADD: +2.25
OS_OVR_VA: 20/
OS_ADD: +2.25
OD_SPHERE: +5.00
OD_VPRISM_DIRECTION: PROGS
OD_ADD: +2.25

## 2024-11-12 ASSESSMENT — SUPERFICIAL PUNCTATE KERATITIS (SPK)
OS_SPK: 2+
OD_SPK: 2+

## 2024-11-12 ASSESSMENT — VISUAL ACUITY
OD_BCVA: 20/40
OS_BCVA: 20/20-3

## 2024-11-12 ASSESSMENT — CONFRONTATIONAL VISUAL FIELD TEST (CVF)
OS_FINDINGS: FULL
OD_FINDINGS: FULL

## 2024-11-12 ASSESSMENT — KERATOMETRY
OS_K2POWER_DIOPTERS: 46.00
OD_K1POWER_DIOPTERS: 45.00
OS_K1POWER_DIOPTERS: 44.75
OS_AXISANGLE_DEGREES: 081
OD_K2POWER_DIOPTERS: 45.25
OD_AXISANGLE_DEGREES: 087

## 2024-11-12 ASSESSMENT — TONOMETRY: OS_IOP_MMHG: 11

## 2024-11-18 ENCOUNTER — OFFICE (OUTPATIENT)
Facility: LOCATION | Age: 74
Setting detail: OPHTHALMOLOGY
End: 2024-11-18
Payer: MEDICARE

## 2024-11-18 ENCOUNTER — RX ONLY (RX ONLY)
Age: 74
End: 2024-11-18

## 2024-11-18 DIAGNOSIS — H25.11: ICD-10-CM

## 2024-11-18 DIAGNOSIS — Z96.1: ICD-10-CM

## 2024-11-18 PROCEDURE — 92136 OPHTHALMIC BIOMETRY: CPT | Mod: 26,RT | Performed by: OPHTHALMOLOGY

## 2024-11-18 PROCEDURE — 99024 POSTOP FOLLOW-UP VISIT: CPT | Performed by: OPHTHALMOLOGY

## 2024-11-18 ASSESSMENT — LID EXAM ASSESSMENTS
OD_COMMENTS: GOOD PARALYSIS AT AREA INJECTED UL COMMENTS
OD_BLEPHARITIS: 2+
OS_COMMENTS: GOOD PARALYSIS AT AREA INJECTED UL COMMENTS
OS_BLEPHARITIS: 2+

## 2024-11-18 ASSESSMENT — REFRACTION_CURRENTRX
OS_ADD: +2.25
OD_AXIS: 094
OS_ADD: +2.25
OS_AXIS: 094
OD_CYLINDER: -0.75
OS_CYLINDER: -1.00
OD_AXIS: 090
OS_SPHERE: +4.50
OD_ADD: +2.25
OD_OVR_VA: 20/
OS_OVR_VA: 20/
OD_CYLINDER: -0.50
OD_ADD: +2.25
OS_SPHERE: +4.00
OS_OVR_VA: 20/
OD_OVR_VA: 20/
OS_VPRISM_DIRECTION: PROGS
OD_SPHERE: +5.00
OD_SPHERE: +5.25
OS_AXIS: 090
OS_CYLINDER: -1.00
OD_VPRISM_DIRECTION: PROGS

## 2024-11-18 ASSESSMENT — CONFRONTATIONAL VISUAL FIELD TEST (CVF)
OS_FINDINGS: FULL
OD_FINDINGS: FULL

## 2024-11-18 ASSESSMENT — KERATOMETRY
OS_AXISANGLE_DEGREES: 081
OS_K2POWER_DIOPTERS: 46.00
OD_K1POWER_DIOPTERS: 45.00
OD_K2POWER_DIOPTERS: 45.25
OS_K1POWER_DIOPTERS: 44.75
OD_AXISANGLE_DEGREES: 087

## 2024-11-18 ASSESSMENT — REFRACTION_AUTOREFRACTION
OS_CYLINDER: SPHERE
OD_CYLINDER: -1.00
OD_SPHERE: +5.75
OD_AXIS: 055
OS_SPHERE: +1.00

## 2024-11-18 ASSESSMENT — VISUAL ACUITY
OD_BCVA: 20/25+3
OS_BCVA: 20/40

## 2024-11-18 ASSESSMENT — SUPERFICIAL PUNCTATE KERATITIS (SPK)
OD_SPK: 2+
OS_SPK: 2+

## 2024-11-20 PROCEDURE — 76536 US EXAM OF HEAD AND NECK: CPT

## 2024-11-25 ENCOUNTER — ASC (OUTPATIENT)
Dept: URBAN - METROPOLITAN AREA SURGERY 8 | Facility: SURGERY | Age: 74
Setting detail: OPHTHALMOLOGY
End: 2024-11-25
Payer: MEDICARE

## 2024-11-25 DIAGNOSIS — H25.11: ICD-10-CM

## 2024-11-25 DIAGNOSIS — H52.211: ICD-10-CM

## 2024-11-25 PROCEDURE — FEMTO FEMTOSECOND LASER: Mod: GY | Performed by: OPHTHALMOLOGY

## 2024-11-25 PROCEDURE — 66984 XCAPSL CTRC RMVL W/O ECP: CPT | Mod: 79,RT | Performed by: OPHTHALMOLOGY

## 2024-11-25 PROCEDURE — 68841 INSJ RX ELUT IMPLT LAC CANAL: CPT | Mod: 79,RT | Performed by: OPHTHALMOLOGY

## 2024-11-25 PROCEDURE — A9270 NON-COVERED ITEM OR SERVICE: HCPCS | Mod: GY | Performed by: OPHTHALMOLOGY

## 2024-11-26 ENCOUNTER — OFFICE (OUTPATIENT)
Facility: LOCATION | Age: 74
Setting detail: OPHTHALMOLOGY
End: 2024-11-26
Payer: MEDICARE

## 2024-11-26 ENCOUNTER — RX ONLY (RX ONLY)
Age: 74
End: 2024-11-26

## 2024-11-26 DIAGNOSIS — H26.492: ICD-10-CM

## 2024-11-26 DIAGNOSIS — Z96.1: ICD-10-CM

## 2024-11-26 PROCEDURE — 99024 POSTOP FOLLOW-UP VISIT: CPT | Performed by: OPHTHALMOLOGY

## 2024-11-26 ASSESSMENT — REFRACTION_CURRENTRX
OD_OVR_VA: 20/
OS_AXIS: 094
OS_AXIS: 090
OD_SPHERE: +5.00
OD_SPHERE: +5.25
OD_CYLINDER: -0.75
OS_CYLINDER: -1.00
OS_SPHERE: +4.50
OS_SPHERE: +4.00
OD_ADD: +2.25
OS_VPRISM_DIRECTION: PROGS
OD_CYLINDER: -0.50
OD_OVR_VA: 20/
OD_AXIS: 094
OS_OVR_VA: 20/
OS_ADD: +2.25
OD_AXIS: 090
OD_VPRISM_DIRECTION: PROGS
OD_ADD: +2.25
OS_CYLINDER: -1.00
OS_OVR_VA: 20/
OS_ADD: +2.25

## 2024-11-26 ASSESSMENT — SUPERFICIAL PUNCTATE KERATITIS (SPK)
OD_SPK: 2+
OS_SPK: 2+

## 2024-11-26 ASSESSMENT — KERATOMETRY
OD_AXISANGLE_DEGREES: 125
OS_AXISANGLE_DEGREES: 125
OD_K1POWER_DIOPTERS: 44.50
METHOD_AUTO_MANUAL: AUTO
OS_K2POWER_DIOPTERS: 43.25
OD_K2POWER_DIOPTERS: 44.25
OS_K1POWER_DIOPTERS: 44.75

## 2024-11-26 ASSESSMENT — CONFRONTATIONAL VISUAL FIELD TEST (CVF)
OS_FINDINGS: FULL
OD_FINDINGS: FULL

## 2024-11-26 ASSESSMENT — LID EXAM ASSESSMENTS
OS_BLEPHARITIS: 2+
OD_BLEPHARITIS: 2+
OD_COMMENTS: GOOD PARALYSIS AT AREA INJECTED UL COMMENTS
OS_COMMENTS: GOOD PARALYSIS AT AREA INJECTED UL COMMENTS

## 2024-11-26 ASSESSMENT — VISUAL ACUITY
OD_BCVA: 20/20-2
OS_BCVA: 20/30-1

## 2024-11-26 ASSESSMENT — TONOMETRY
OD_IOP_MMHG: 11
OS_IOP_MMHG: 13

## 2024-11-26 ASSESSMENT — REFRACTION_AUTOREFRACTION
OD_CYLINDER: -0.75
OD_SPHERE: +1.50
OS_CYLINDER: SPHERE
OD_AXIS: 100
OS_SPHERE: +1.25

## 2024-12-03 ENCOUNTER — OFFICE (OUTPATIENT)
Facility: LOCATION | Age: 74
Setting detail: OPHTHALMOLOGY
End: 2024-12-03
Payer: MEDICARE

## 2024-12-03 DIAGNOSIS — H26.492: ICD-10-CM

## 2024-12-03 DIAGNOSIS — Z96.1: ICD-10-CM

## 2024-12-03 PROCEDURE — 99024 POSTOP FOLLOW-UP VISIT: CPT | Performed by: OPHTHALMOLOGY

## 2024-12-03 ASSESSMENT — REFRACTION_AUTOREFRACTION
OD_CYLINDER: -0.50
OD_AXIS: 070
OS_CYLINDER: SPHERE
OD_SPHERE: +0.75
OS_SPHERE: +1.50

## 2024-12-03 ASSESSMENT — REFRACTION_CURRENTRX
OS_ADD: +2.25
OD_AXIS: 094
OD_SPHERE: +5.00
OD_OVR_VA: 20/
OS_AXIS: 090
OS_SPHERE: +4.00
OD_SPHERE: +5.25
OS_SPHERE: +4.50
OS_VPRISM_DIRECTION: PROGS
OD_OVR_VA: 20/
OD_ADD: +2.25
OS_AXIS: 094
OS_ADD: +2.25
OS_OVR_VA: 20/
OD_CYLINDER: -0.75
OD_CYLINDER: -0.50
OD_ADD: +2.25
OD_AXIS: 090
OD_VPRISM_DIRECTION: PROGS
OS_OVR_VA: 20/
OS_CYLINDER: -1.00
OS_CYLINDER: -1.00

## 2024-12-03 ASSESSMENT — KERATOMETRY
METHOD_AUTO_MANUAL: AUTO
OD_K2POWER_DIOPTERS: 45.00
OS_K2POWER_DIOPTERS: 45.50
OS_AXISANGLE_DEGREES: 085
OS_K1POWER_DIOPTERS: 44.75
OD_AXISANGLE_DEGREES: 080
OD_K1POWER_DIOPTERS: 44.75

## 2024-12-03 ASSESSMENT — SUPERFICIAL PUNCTATE KERATITIS (SPK)
OD_SPK: 2+
OS_SPK: 2+

## 2024-12-03 ASSESSMENT — LID EXAM ASSESSMENTS
OS_COMMENTS: GOOD PARALYSIS AT AREA INJECTED UL COMMENTS
OD_COMMENTS: GOOD PARALYSIS AT AREA INJECTED UL COMMENTS
OS_BLEPHARITIS: 2+
OD_BLEPHARITIS: 2+

## 2024-12-03 ASSESSMENT — VISUAL ACUITY
OD_BCVA: 20/30
OS_BCVA: 20/25-2

## 2024-12-03 ASSESSMENT — TONOMETRY
OD_IOP_MMHG: 13
OS_IOP_MMHG: 13

## 2024-12-03 ASSESSMENT — CONFRONTATIONAL VISUAL FIELD TEST (CVF)
OS_FINDINGS: FULL
OD_FINDINGS: FULL

## 2024-12-04 DIAGNOSIS — R91.1 SOLITARY PULMONARY NODULE: ICD-10-CM

## 2024-12-11 ENCOUNTER — OUTPATIENT (OUTPATIENT)
Dept: OUTPATIENT SERVICES | Facility: HOSPITAL | Age: 74
LOS: 1 days | End: 2024-12-11
Payer: MEDICARE

## 2024-12-11 ENCOUNTER — APPOINTMENT (OUTPATIENT)
Dept: CT IMAGING | Facility: CLINIC | Age: 74
End: 2024-12-11
Payer: MEDICARE

## 2024-12-11 DIAGNOSIS — Z00.8 ENCOUNTER FOR OTHER GENERAL EXAMINATION: ICD-10-CM

## 2024-12-11 DIAGNOSIS — Z92.21 PERSONAL HISTORY OF ANTINEOPLASTIC CHEMOTHERAPY: Chronic | ICD-10-CM

## 2024-12-11 DIAGNOSIS — Z98.89 OTHER SPECIFIED POSTPROCEDURAL STATES: Chronic | ICD-10-CM

## 2024-12-11 DIAGNOSIS — Z90.711 ACQUIRED ABSENCE OF UTERUS WITH REMAINING CERVICAL STUMP: Chronic | ICD-10-CM

## 2024-12-11 PROCEDURE — 71250 CT THORAX DX C-: CPT

## 2024-12-11 PROCEDURE — 71250 CT THORAX DX C-: CPT | Mod: 26,MH

## 2024-12-16 ENCOUNTER — NON-APPOINTMENT (OUTPATIENT)
Age: 74
End: 2024-12-16

## 2025-01-02 ENCOUNTER — OFFICE (OUTPATIENT)
Facility: LOCATION | Age: 75
Setting detail: OPHTHALMOLOGY
End: 2025-01-02
Payer: MEDICARE

## 2025-01-02 DIAGNOSIS — H26.492: ICD-10-CM

## 2025-01-02 DIAGNOSIS — Z96.1: ICD-10-CM

## 2025-01-02 PROCEDURE — 99024 POSTOP FOLLOW-UP VISIT: CPT | Performed by: OPHTHALMOLOGY

## 2025-01-02 ASSESSMENT — REFRACTION_MANIFEST
OD_ADD: +2.75
OS_AXIS: 180
OS_CYLINDER: -0.75
OD_SPHERE: PLANO
OS_ADD: +2.75
OS_SPHERE: +1.50

## 2025-01-02 ASSESSMENT — REFRACTION_CURRENTRX
OS_OVR_VA: 20/
OD_OVR_VA: 20/
OD_CYLINDER: -0.75
OD_SPHERE: +5.25
OD_ADD: +2.25
OS_OVR_VA: 20/
OD_SPHERE: +2.50
OD_CYLINDER: -0.50
OS_VPRISM_DIRECTION: PROGS
OD_SPHERE: +5.00
OD_ADD: +2.25
OS_CYLINDER: -1.00
OS_ADD: +2.25
OS_SPHERE: +2.50
OD_AXIS: 090
OS_ADD: +2.25
OS_CYLINDER: -1.00
OD_OVR_VA: 20/
OS_SPHERE: +4.00
OD_OVR_VA: 20/
OS_SPHERE: +4.50
OS_AXIS: 094
OS_AXIS: 090
OS_OVR_VA: 20/
OD_VPRISM_DIRECTION: PROGS
OD_AXIS: 094

## 2025-01-02 ASSESSMENT — REFRACTION_AUTOREFRACTION
OS_SPHERE: +1.50
OS_CYLINDER: -0.75
OS_AXIS: 177
OD_SPHERE: +0.25
OD_CYLINDER: SPH

## 2025-01-02 ASSESSMENT — VISUAL ACUITY
OS_BCVA: 20/20-1
OD_BCVA: 20/40+1

## 2025-01-02 ASSESSMENT — SUPERFICIAL PUNCTATE KERATITIS (SPK)
OD_SPK: 2+
OS_SPK: 2+

## 2025-01-02 ASSESSMENT — TONOMETRY
OD_IOP_MMHG: 12
OS_IOP_MMHG: 12

## 2025-01-02 ASSESSMENT — KERATOMETRY
METHOD_AUTO_MANUAL: AUTO
OS_K1POWER_DIOPTERS: 44.75
OS_K2POWER_DIOPTERS: 46.00
OD_AXISANGLE_DEGREES: 103
OD_K1POWER_DIOPTERS: 45.00
OD_K2POWER_DIOPTERS: 45.75
OS_AXISANGLE_DEGREES: 082

## 2025-01-02 ASSESSMENT — LID EXAM ASSESSMENTS
OS_BLEPHARITIS: 2+
OS_COMMENTS: GOOD PARALYSIS AT AREA INJECTED UL COMMENTS
OD_BLEPHARITIS: 2+
OD_COMMENTS: GOOD PARALYSIS AT AREA INJECTED UL COMMENTS

## 2025-01-02 ASSESSMENT — CONFRONTATIONAL VISUAL FIELD TEST (CVF)
OS_FINDINGS: FULL
OD_FINDINGS: FULL

## 2025-01-09 ENCOUNTER — RX ONLY (RX ONLY)
Age: 75
End: 2025-01-09

## 2025-01-09 ENCOUNTER — OFFICE (OUTPATIENT)
Facility: LOCATION | Age: 75
Setting detail: OPHTHALMOLOGY
End: 2025-01-09
Payer: MEDICARE

## 2025-01-09 DIAGNOSIS — H35.372: ICD-10-CM

## 2025-01-09 DIAGNOSIS — H26.492: ICD-10-CM

## 2025-01-09 DIAGNOSIS — Z96.1: ICD-10-CM

## 2025-01-09 PROCEDURE — 92134 CPTRZ OPH DX IMG PST SGM RTA: CPT | Performed by: OPHTHALMOLOGY

## 2025-01-09 PROCEDURE — 99024 POSTOP FOLLOW-UP VISIT: CPT | Performed by: OPHTHALMOLOGY

## 2025-01-09 PROCEDURE — 66821 AFTER CATARACT LASER SURGERY: CPT | Mod: 79,LT | Performed by: OPHTHALMOLOGY

## 2025-01-09 ASSESSMENT — REFRACTION_CURRENTRX
OD_ADD: +2.25
OD_AXIS: 094
OD_CYLINDER: -0.50
OS_CYLINDER: -1.00
OD_SPHERE: +2.50
OS_VPRISM_DIRECTION: SV
OD_OVR_VA: 20/
OD_VPRISM_DIRECTION: SV
OS_SPHERE: +4.50
OD_ADD: +2.25
OS_SPHERE: +4.00
OS_SPHERE: +2.50
OS_OVR_VA: 20/
OD_SPHERE: +5.00
OS_CYLINDER: -1.00
OD_SPHERE: +5.25
OS_VPRISM_DIRECTION: PROGS
OD_VPRISM_DIRECTION: PROGS
OD_OVR_VA: 20/
OS_AXIS: 094
OD_OVR_VA: 20/
OS_AXIS: 090
OS_ADD: +2.25
OS_OVR_VA: 20/
OS_ADD: +2.25
OS_OVR_VA: 20/
OD_AXIS: 090
OD_CYLINDER: -0.75

## 2025-01-09 ASSESSMENT — KERATOMETRY
OD_AXISANGLE_DEGREES: 102
OS_AXISANGLE_DEGREES: 075
METHOD_AUTO_MANUAL: AUTO
OD_K2POWER_DIOPTERS: 45.25
OS_K2POWER_DIOPTERS: 45.75
OD_K1POWER_DIOPTERS: 44.75
OS_K1POWER_DIOPTERS: 44.50

## 2025-01-09 ASSESSMENT — CONFRONTATIONAL VISUAL FIELD TEST (CVF)
OS_FINDINGS: FULL
OD_FINDINGS: FULL

## 2025-01-09 ASSESSMENT — SUPERFICIAL PUNCTATE KERATITIS (SPK)
OD_SPK: 2+
OS_SPK: 3+

## 2025-01-09 ASSESSMENT — REFRACTION_AUTOREFRACTION
OS_CYLINDER: -0.25
OD_SPHERE: +1.00
OS_AXIS: 006
OD_AXIS: 095
OS_SPHERE: +1.50
OD_CYLINDER: -0.50

## 2025-01-09 ASSESSMENT — REFRACTION_MANIFEST
OS_ADD: +2.75
OS_CYLINDER: -0.75
OS_SPHERE: +1.50
OS_AXIS: 180
OD_SPHERE: PLANO
OD_ADD: +2.75

## 2025-01-09 ASSESSMENT — VISUAL ACUITY
OS_BCVA: 20/20-2
OD_BCVA: 20/25+1

## 2025-01-22 ENCOUNTER — APPOINTMENT (OUTPATIENT)
Dept: CARDIOLOGY | Facility: CLINIC | Age: 75
End: 2025-01-22

## 2025-02-06 ENCOUNTER — OFFICE (OUTPATIENT)
Facility: LOCATION | Age: 75
Setting detail: OPHTHALMOLOGY
End: 2025-02-06
Payer: MEDICARE

## 2025-02-06 DIAGNOSIS — H52.4: ICD-10-CM

## 2025-02-06 DIAGNOSIS — Z96.1: ICD-10-CM

## 2025-02-06 PROCEDURE — 99024 POSTOP FOLLOW-UP VISIT: CPT | Performed by: OPHTHALMOLOGY

## 2025-02-06 PROCEDURE — 92015 DETERMINE REFRACTIVE STATE: CPT | Performed by: OPHTHALMOLOGY

## 2025-02-06 ASSESSMENT — REFRACTION_CURRENTRX
OD_SPHERE: +2.50
OD_CYLINDER: -0.50
OS_CYLINDER: -1.00
OD_VPRISM_DIRECTION: PROGS
OD_ADD: +2.25
OD_VPRISM_DIRECTION: SV
OS_OVR_VA: 20/
OD_SPHERE: +5.00
OS_OVR_VA: 20/
OS_VPRISM_DIRECTION: PROGS
OD_CYLINDER: SPH
OD_OVR_VA: 20/
OS_VPRISM_DIRECTION: SV
OD_OVR_VA: 20/
OD_SPHERE: +2.50
OS_CYLINDER: SPH
OS_SPHERE: +2.50
OD_AXIS: 094
OS_OVR_VA: 20/
OS_AXIS: 094
OS_ADD: +2.25
OS_SPHERE: +4.00
OS_SPHERE: +2.50
OD_OVR_VA: 20/

## 2025-02-06 ASSESSMENT — REFRACTION_MANIFEST
OS_AXIS: 180
OD_CYLINDER: -0.50
OS_ADD: +2.50
OS_SPHERE: +1.50
OD_SPHERE: +1.00
OS_CYLINDER: -0.25
OD_ADD: +2.75
OD_ADD: +2.50
OS_SPHERE: +1.50
OS_CYLINDER: -0.75
OD_SPHERE: PLANO
OD_AXIS: 100
OS_AXIS: 180
OS_ADD: +2.75

## 2025-02-06 ASSESSMENT — LID EXAM ASSESSMENTS
OS_BLEPHARITIS: 2+
OD_COMMENTS: GOOD PARALYSIS AT AREA INJECTED UL COMMENTS
OS_COMMENTS: GOOD PARALYSIS AT AREA INJECTED UL COMMENTS
OD_BLEPHARITIS: 2+

## 2025-02-06 ASSESSMENT — SUPERFICIAL PUNCTATE KERATITIS (SPK)
OS_SPK: 3+
OD_SPK: 2+

## 2025-02-06 ASSESSMENT — REFRACTION_AUTOREFRACTION
OD_AXIS: 101
OS_CYLINDER: -0.25
OS_SPHERE: +1.50
OD_SPHERE: +1.00
OS_AXIS: 179
OD_CYLINDER: -0.50

## 2025-02-06 ASSESSMENT — CONFRONTATIONAL VISUAL FIELD TEST (CVF)
OD_FINDINGS: FULL
OS_FINDINGS: FULL

## 2025-02-06 ASSESSMENT — VISUAL ACUITY
OS_BCVA: 20/20-2
OD_BCVA: 20/30-2

## 2025-02-06 ASSESSMENT — KERATOMETRY
OD_K1POWER_DIOPTERS: 44.75
OS_K1POWER_DIOPTERS: 44.50
OD_AXISANGLE_DEGREES: 102
OS_K2POWER_DIOPTERS: 45.75
OS_AXISANGLE_DEGREES: 075
METHOD_AUTO_MANUAL: AUTO
OD_K2POWER_DIOPTERS: 45.25

## 2025-02-27 ENCOUNTER — APPOINTMENT (OUTPATIENT)
Dept: CARDIOLOGY | Facility: CLINIC | Age: 75
End: 2025-02-27
Payer: MEDICARE

## 2025-02-27 ENCOUNTER — APPOINTMENT (OUTPATIENT)
Dept: PULMONOLOGY | Facility: CLINIC | Age: 75
End: 2025-02-27
Payer: MEDICARE

## 2025-02-27 VITALS
SYSTOLIC BLOOD PRESSURE: 103 MMHG | TEMPERATURE: 98.4 F | HEART RATE: 71 BPM | DIASTOLIC BLOOD PRESSURE: 66 MMHG | OXYGEN SATURATION: 94 %

## 2025-02-27 VITALS
WEIGHT: 144 LBS | TEMPERATURE: 97.8 F | BODY MASS INDEX: 25.52 KG/M2 | HEART RATE: 70 BPM | DIASTOLIC BLOOD PRESSURE: 70 MMHG | SYSTOLIC BLOOD PRESSURE: 100 MMHG | HEIGHT: 63 IN | OXYGEN SATURATION: 96 %

## 2025-02-27 DIAGNOSIS — R93.1 ABNORMAL FINDINGS ON DIAGNOSTIC IMAGING OF HEART AND CORONARY CIRCULATION: ICD-10-CM

## 2025-02-27 DIAGNOSIS — R91.1 SOLITARY PULMONARY NODULE: ICD-10-CM

## 2025-02-27 DIAGNOSIS — E04.1 NONTOXIC SINGLE THYROID NODULE: ICD-10-CM

## 2025-02-27 DIAGNOSIS — I35.1 NONRHEUMATIC AORTIC (VALVE) INSUFFICIENCY: ICD-10-CM

## 2025-02-27 DIAGNOSIS — I49.3 VENTRICULAR PREMATURE DEPOLARIZATION: ICD-10-CM

## 2025-02-27 DIAGNOSIS — R94.31 ABNORMAL ELECTROCARDIOGRAM [ECG] [EKG]: ICD-10-CM

## 2025-02-27 DIAGNOSIS — H92.02 OTALGIA, LEFT EAR: ICD-10-CM

## 2025-02-27 DIAGNOSIS — J70.1 CHRONIC AND OTHER PULMONARY MANIFESTATIONS DUE TO RADIATION: ICD-10-CM

## 2025-02-27 DIAGNOSIS — M51.9 UNSPECIFIED THORACIC, THORACOLUMBAR AND LUMBOSACRAL INTERVERTEBRAL DISC DISORDER: ICD-10-CM

## 2025-02-27 DIAGNOSIS — R06.09 OTHER FORMS OF DYSPNEA: ICD-10-CM

## 2025-02-27 DIAGNOSIS — Z13.228 ENCOUNTER FOR SCREENING FOR OTHER METABOLIC DISORDERS: ICD-10-CM

## 2025-02-27 DIAGNOSIS — C50.919 MALIGNANT NEOPLASM OF UNSPECIFIED SITE OF UNSPECIFIED FEMALE BREAST: ICD-10-CM

## 2025-02-27 DIAGNOSIS — E78.00 PURE HYPERCHOLESTEROLEMIA, UNSPECIFIED: ICD-10-CM

## 2025-02-27 PROCEDURE — G2211 COMPLEX E/M VISIT ADD ON: CPT

## 2025-02-27 PROCEDURE — 94727 GAS DIL/WSHOT DETER LNG VOL: CPT

## 2025-02-27 PROCEDURE — 99214 OFFICE O/P EST MOD 30 MIN: CPT

## 2025-02-27 PROCEDURE — 94010 BREATHING CAPACITY TEST: CPT

## 2025-02-27 PROCEDURE — 94729 DIFFUSING CAPACITY: CPT

## 2025-02-27 PROCEDURE — 99214 OFFICE O/P EST MOD 30 MIN: CPT | Mod: 25

## 2025-02-27 PROCEDURE — 93000 ELECTROCARDIOGRAM COMPLETE: CPT

## 2025-02-27 RX ORDER — FLUTICASONE PROPIONATE 50 UG/1
50 SPRAY, METERED NASAL
Qty: 16 | Refills: 1 | Status: ACTIVE | COMMUNITY
Start: 2025-02-27 | End: 1900-01-01

## 2025-02-28 LAB
ALBUMIN SERPL ELPH-MCNC: 4.6 G/DL
ALP BLD-CCNC: 111 U/L
ALT SERPL-CCNC: 31 U/L
ANION GAP SERPL CALC-SCNC: 14 MMOL/L
APPEARANCE: CLEAR
AST SERPL-CCNC: 28 U/L
BACTERIA: NEGATIVE /HPF
BASOPHILS # BLD AUTO: 0.02 K/UL
BASOPHILS NFR BLD AUTO: 0.3 %
BILIRUB DIRECT SERPL-MCNC: 0.1 MG/DL
BILIRUB INDIRECT SERPL-MCNC: 0.1 MG/DL
BILIRUB SERPL-MCNC: 0.2 MG/DL
BILIRUBIN URINE: NEGATIVE
BLOOD URINE: NEGATIVE
BUN SERPL-MCNC: 15 MG/DL
CALCIUM SERPL-MCNC: 9.8 MG/DL
CAST: 0 /LPF
CHLORIDE SERPL-SCNC: 103 MMOL/L
CHOLEST SERPL-MCNC: 164 MG/DL
CK SERPL-CCNC: 88 U/L
CO2 SERPL-SCNC: 24 MMOL/L
COLOR: YELLOW
CREAT SERPL-MCNC: 0.64 MG/DL
EGFR: 92 ML/MIN/1.73M2
EOSINOPHIL # BLD AUTO: 0.01 K/UL
EOSINOPHIL NFR BLD AUTO: 0.1 %
EPITHELIAL CELLS: 0 /HPF
ESTIMATED AVERAGE GLUCOSE: 108 MG/DL
GLUCOSE QUALITATIVE U: NEGATIVE MG/DL
GLUCOSE SERPL-MCNC: 70 MG/DL
HBA1C MFR BLD HPLC: 5.4 %
HCT VFR BLD CALC: 41.4 %
HDLC SERPL-MCNC: 48 MG/DL
HGB BLD-MCNC: 13.7 G/DL
IMM GRANULOCYTES NFR BLD AUTO: 0.1 %
KETONES URINE: NEGATIVE MG/DL
LDLC SERPL CALC-MCNC: 87 MG/DL
LEUKOCYTE ESTERASE URINE: NEGATIVE
LYMPHOCYTES # BLD AUTO: 2.11 K/UL
LYMPHOCYTES NFR BLD AUTO: 30.4 %
MAGNESIUM SERPL-MCNC: 2.4 MG/DL
MAN DIFF?: NORMAL
MCHC RBC-ENTMCNC: 31.2 PG
MCHC RBC-ENTMCNC: 33.1 G/DL
MCV RBC AUTO: 94.3 FL
MICROSCOPIC-UA: NORMAL
MONOCYTES # BLD AUTO: 0.48 K/UL
MONOCYTES NFR BLD AUTO: 6.9 %
NEUTROPHILS # BLD AUTO: 4.31 K/UL
NEUTROPHILS NFR BLD AUTO: 62.2 %
NITRITE URINE: NEGATIVE
NONHDLC SERPL-MCNC: 116 MG/DL
PH URINE: 7
PLATELET # BLD AUTO: 307 K/UL
POTASSIUM SERPL-SCNC: 4.6 MMOL/L
PROT SERPL-MCNC: 7.1 G/DL
PROTEIN URINE: NEGATIVE MG/DL
RBC # BLD: 4.39 M/UL
RBC # FLD: 13.6 %
RED BLOOD CELLS URINE: 4 /HPF
SODIUM SERPL-SCNC: 141 MMOL/L
SPECIFIC GRAVITY URINE: 1.02
T4 FREE SERPL-MCNC: 1.2 NG/DL
TRIGL SERPL-MCNC: 171 MG/DL
TSH SERPL-ACNC: 2.62 UIU/ML
UROBILINOGEN URINE: 1 MG/DL
WBC # FLD AUTO: 6.94 K/UL
WHITE BLOOD CELLS URINE: 0 /HPF

## 2025-03-02 LAB — BACTERIA UR CULT: ABNORMAL

## 2025-05-05 ENCOUNTER — APPOINTMENT (OUTPATIENT)
Dept: CARDIOLOGY | Facility: CLINIC | Age: 75
End: 2025-05-05
Payer: MEDICARE

## 2025-05-05 VITALS — DIASTOLIC BLOOD PRESSURE: 70 MMHG | SYSTOLIC BLOOD PRESSURE: 120 MMHG

## 2025-05-05 VITALS
OXYGEN SATURATION: 95 % | BODY MASS INDEX: 25.16 KG/M2 | WEIGHT: 142 LBS | TEMPERATURE: 98 F | SYSTOLIC BLOOD PRESSURE: 120 MMHG | HEART RATE: 78 BPM | HEIGHT: 63 IN | DIASTOLIC BLOOD PRESSURE: 70 MMHG

## 2025-05-05 DIAGNOSIS — R06.09 OTHER FORMS OF DYSPNEA: ICD-10-CM

## 2025-05-05 DIAGNOSIS — R82.90 UNSPECIFIED ABNORMAL FINDINGS IN URINE: ICD-10-CM

## 2025-05-05 DIAGNOSIS — E04.1 NONTOXIC SINGLE THYROID NODULE: ICD-10-CM

## 2025-05-05 DIAGNOSIS — H92.02 OTALGIA, LEFT EAR: ICD-10-CM

## 2025-05-05 DIAGNOSIS — J70.1 CHRONIC AND OTHER PULMONARY MANIFESTATIONS DUE TO RADIATION: ICD-10-CM

## 2025-05-05 DIAGNOSIS — E78.00 PURE HYPERCHOLESTEROLEMIA, UNSPECIFIED: ICD-10-CM

## 2025-05-05 DIAGNOSIS — I35.1 NONRHEUMATIC AORTIC (VALVE) INSUFFICIENCY: ICD-10-CM

## 2025-05-05 DIAGNOSIS — C50.919 MALIGNANT NEOPLASM OF UNSPECIFIED SITE OF UNSPECIFIED FEMALE BREAST: ICD-10-CM

## 2025-05-05 DIAGNOSIS — R91.1 SOLITARY PULMONARY NODULE: ICD-10-CM

## 2025-05-05 DIAGNOSIS — M51.9 UNSPECIFIED THORACIC, THORACOLUMBAR AND LUMBOSACRAL INTERVERTEBRAL DISC DISORDER: ICD-10-CM

## 2025-05-05 DIAGNOSIS — R94.31 ABNORMAL ELECTROCARDIOGRAM [ECG] [EKG]: ICD-10-CM

## 2025-05-05 DIAGNOSIS — I49.3 VENTRICULAR PREMATURE DEPOLARIZATION: ICD-10-CM

## 2025-05-05 DIAGNOSIS — R93.1 ABNORMAL FINDINGS ON DIAGNOSTIC IMAGING OF HEART AND CORONARY CIRCULATION: ICD-10-CM

## 2025-05-05 PROCEDURE — G2211 COMPLEX E/M VISIT ADD ON: CPT

## 2025-05-05 PROCEDURE — 93000 ELECTROCARDIOGRAM COMPLETE: CPT

## 2025-05-05 PROCEDURE — 99214 OFFICE O/P EST MOD 30 MIN: CPT

## 2025-05-06 LAB
APPEARANCE: CLEAR
BACTERIA: NEGATIVE /HPF
BILIRUBIN URINE: NEGATIVE
BLOOD URINE: NEGATIVE
CAST: 0 /LPF
COLOR: YELLOW
EPITHELIAL CELLS: 1 /HPF
GLUCOSE QUALITATIVE U: NEGATIVE MG/DL
KETONES URINE: NEGATIVE MG/DL
LEUKOCYTE ESTERASE URINE: ABNORMAL
MICROSCOPIC-UA: NORMAL
NITRITE URINE: NEGATIVE
PH URINE: 7.5
PROTEIN URINE: NEGATIVE MG/DL
RED BLOOD CELLS URINE: 4 /HPF
SPECIFIC GRAVITY URINE: 1.02
UROBILINOGEN URINE: 0.2 MG/DL
WHITE BLOOD CELLS URINE: 1 /HPF

## 2025-05-07 LAB — BACTERIA UR CULT: NORMAL

## 2025-05-15 ENCOUNTER — APPOINTMENT (OUTPATIENT)
Dept: CARDIOLOGY | Facility: CLINIC | Age: 75
End: 2025-05-15
Payer: MEDICARE

## 2025-05-15 PROCEDURE — 93306 TTE W/DOPPLER COMPLETE: CPT

## 2025-07-28 ENCOUNTER — RX RENEWAL (OUTPATIENT)
Age: 75
End: 2025-07-28

## 2025-09-11 ENCOUNTER — APPOINTMENT (OUTPATIENT)
Dept: ENDOCRINOLOGY | Facility: CLINIC | Age: 75
End: 2025-09-11